# Patient Record
Sex: FEMALE | Race: WHITE | NOT HISPANIC OR LATINO | URBAN - METROPOLITAN AREA
[De-identification: names, ages, dates, MRNs, and addresses within clinical notes are randomized per-mention and may not be internally consistent; named-entity substitution may affect disease eponyms.]

---

## 2023-05-30 ENCOUNTER — EMERGENCY (EMERGENCY)
Facility: HOSPITAL | Age: 23
LOS: 1 days | Discharge: HOME | End: 2023-05-30
Attending: EMERGENCY MEDICINE | Admitting: EMERGENCY MEDICINE
Payer: SELF-PAY

## 2023-05-30 VITALS
DIASTOLIC BLOOD PRESSURE: 70 MMHG | OXYGEN SATURATION: 99 % | TEMPERATURE: 98 F | SYSTOLIC BLOOD PRESSURE: 112 MMHG | HEART RATE: 72 BPM | RESPIRATION RATE: 16 BRPM

## 2023-05-30 VITALS
OXYGEN SATURATION: 99 % | SYSTOLIC BLOOD PRESSURE: 106 MMHG | DIASTOLIC BLOOD PRESSURE: 79 MMHG | RESPIRATION RATE: 16 BRPM | HEART RATE: 70 BPM | TEMPERATURE: 99 F

## 2023-05-30 DIAGNOSIS — Z91.013 ALLERGY TO SEAFOOD: ICD-10-CM

## 2023-05-30 DIAGNOSIS — Z91.012 ALLERGY TO EGGS: ICD-10-CM

## 2023-05-30 DIAGNOSIS — Z91.011 ALLERGY TO MILK PRODUCTS: ICD-10-CM

## 2023-05-30 DIAGNOSIS — L50.9 URTICARIA, UNSPECIFIED: ICD-10-CM

## 2023-05-30 PROCEDURE — 99284 EMERGENCY DEPT VISIT MOD MDM: CPT

## 2023-05-30 RX ORDER — DIPHENHYDRAMINE HCL 50 MG
1 CAPSULE ORAL
Qty: 15 | Refills: 0
Start: 2023-05-30 | End: 2023-06-03

## 2023-05-30 RX ORDER — DIPHENHYDRAMINE HCL 50 MG
25 CAPSULE ORAL ONCE
Refills: 0 | Status: COMPLETED | OUTPATIENT
Start: 2023-05-30 | End: 2023-05-30

## 2023-05-30 RX ORDER — FAMOTIDINE 10 MG/ML
20 INJECTION INTRAVENOUS ONCE
Refills: 0 | Status: COMPLETED | OUTPATIENT
Start: 2023-05-30 | End: 2023-05-30

## 2023-05-30 RX ORDER — EPINEPHRINE 0.3 MG/.3ML
0.3 INJECTION INTRAMUSCULAR; SUBCUTANEOUS
Qty: 2 | Refills: 0
Start: 2023-05-30

## 2023-05-30 RX ADMIN — FAMOTIDINE 20 MILLIGRAM(S): 10 INJECTION INTRAVENOUS at 21:12

## 2023-05-30 RX ADMIN — Medication 25 MILLIGRAM(S): at 21:12

## 2023-05-30 RX ADMIN — Medication 125 MILLIGRAM(S): at 21:13

## 2023-05-30 NOTE — ED PROVIDER NOTE - OBJECTIVE STATEMENT
22 y/o F w/hx food allergies (dairy, shellfish) w/hives (no anaphyalxis), has never required epi, p/w total body hives, no other sx, which began at 8PM. Pt cannot identify any allergen, hasn't eaten since noon. Took 50mg benadryl 30min PTA w/moderate improvement of rash.

## 2023-05-30 NOTE — ED ADULT NURSE NOTE - NSFALLUNIVINTERV_ED_ALL_ED
Bed/Stretcher in lowest position, wheels locked, appropriate side rails in place/Call bell, personal items and telephone in reach/Instruct patient to call for assistance before getting out of bed/chair/stretcher/Non-slip footwear applied when patient is off stretcher/[place] to call system/Physically safe environment - no spills, clutter or unnecessary equipment/Purposeful proactive rounding/Room/bathroom lighting operational, light cord in reach

## 2023-05-30 NOTE — ED PROVIDER NOTE - NSFOLLOWUPINSTRUCTIONS_ED_ALL_ED_FT
Hives  Hives (urticaria) are itchy, red, swollen areas on the skin. Hives can appear on any part of the body. Hives often fade within 24 hours (acute hives). Sometimes, new hives appear after old ones fade and the cycle can continue for several days or weeks (chronic hives). Hives do not spread from person to person (are not contagious).    Hives come from the body's reaction to something a person is allergic to (allergen), something that causes irritation, or various other triggers. When a person is exposed to a trigger, his or her body releases a chemical (histamine) that causes redness, itching, and swelling. Hives can appear right after exposure to a trigger or hours later.    What are the causes?  This condition may be caused by:  Allergies to foods or ingredients.  Insect bites or stings.  Exposure to pollen or pets.  Spending time in sunlight, heat, or cold (exposure).  Exercise.  Stress.  You can also get hives from other medical conditions and treatments, such as:  Viruses, including the common cold.  Bacterial infections, such as urinary tract infections and strep throat.  Certain medicines.  Contact with latex or chemicals.  Allergy shots.  Blood transfusions.  Sometimes, the cause of this condition is not known (idiopathic hives).    What increases the risk?  You are more likely to develop this condition if you:  Are a woman.  Have food allergies, especially to citrus fruits, milk, eggs, peanuts, tree nuts, or shellfish.  Are allergic to:  Medicines.  Latex.  Insects.  Animals.  Pollen.  What are the signs or symptoms?  A red rash on a person's upper arm.  Common symptoms of this condition include raised, itchy, red or white bumps or patches on your skin. These areas may:  Become large and swollen (welts).  Change in shape and location, quickly and repeatedly.  Be separate hives or connect over a large area of skin.  Sting or become painful.  Turn white when pressed in the center (monse).  In severe cases, your hands, feet, and face may also become swollen. This may occur if hives develop deeper in your skin.    How is this diagnosed?  This condition may be diagnosed by your symptoms, medical history, and physical exam.  Your skin, urine, or blood may be tested to find out what is causing your hives and to rule out other health issues.  Your health care provider may also remove a small sample of skin from the affected area and examine it under a microscope (biopsy).  How is this treated?  Treatment for this condition depends on the cause and severity of your symptoms. Your health care provider may recommend using cool, wet cloths (cool compresses) or taking cool showers to relieve itching. Treatment may include:  Medicines that help:  Relieve itching (antihistamines).  Reduce swelling (corticosteroids).  Treat infection (antibiotics).  An injectable medicine (omalizumab). Your health care provider may prescribe this if you have chronic idiopathic hives and you continue to have symptoms even after treatment with antihistamines.  Severe cases may require an emergency injection of adrenaline (epinephrine) to prevent a life-threatening allergic reaction (anaphylaxis).    Follow these instructions at home:  Medicines    Take and apply over-the-counter and prescription medicines only as told by your health care provider.  If you were prescribed an antibiotic medicine, take it as told by your health care provider. Do not stop using the antibiotic even if you start to feel better.  Skin care    Apply cool compresses to the affected areas.  Do not scratch or rub your skin.  General instructions    Do not take hot showers or baths. This can make itching worse.  Do not wear tight-fitting clothing.  Use sunscreen and wear protective clothing when you are outside.  Avoid any substances that cause your hives. Keep a journal to help track what causes your hives. Write down:  What medicines you take.  What you eat and drink.  What products you use on your skin.  Keep all follow-up visits as told by your health care provider. This is important.  Contact a health care provider if:  Your symptoms are not controlled with medicine.  Your joints are painful or swollen.  Get help right away if:  You have a fever.  You have pain in your abdomen.  Your tongue or lips are swollen.  Your eyelids are swollen.  Your chest or throat feels tight.  You have trouble breathing or swallowing.  These symptoms may represent a serious problem that is an emergency. Do not wait to see if the symptoms will go away. Get medical help right away. Call your local emergency services (911 in the [place] Do not drive yourself to the hospital.    Summary  Hives (urticaria) are itchy, red, swollen areas on your skin. Hives come from the body's reaction to something a person is allergic to (allergen), something that causes irritation, or various other triggers.  Treatment for this condition depends on the cause and severity of your symptoms.  Avoid any substances that cause your hives. Keep a journal to help track what causes your hives.  Take and apply over-the-counter and prescription medicines only as told by your health care provider.  Get help right away if your chest or throat feels tight or if you have trouble breathing or swallowing.

## 2023-05-30 NOTE — ED ADULT TRIAGE NOTE - CHIEF COMPLAINT QUOTE
Pt walked in c/o allergic reaction. Hx of anaphlaxis to shellfish and dairy, states did not have any today. Pt notes generalized hives beginning x1 hour, took benadryl PTA with partial relief. Pt denies diff swallowing or sob.

## 2023-05-30 NOTE — ED PROVIDER NOTE - PATIENT PORTAL LINK FT
You can access the FollowMyHealth Patient Portal offered by [facility] by registering at the following website: [email or web address]. By joining [email or web address]’s FollowMyHealth portal, you will also be able to view your health information using other applications (apps) compatible with our system.

## 2023-05-30 NOTE — ED PROVIDER NOTE - CLINICAL SUMMARY MEDICAL DECISION MAKING FREE TEXT BOX
No anaphylaxis, given benadryl/pepcid/solumedrol in ED w/good effect. VSS, no airway or GI involvement. Will d/c home with continued supportive care measures and return precautions.

## 2023-07-27 ENCOUNTER — EMERGENCY (EMERGENCY)
Facility: HOSPITAL | Age: 23
LOS: 1 days | Discharge: HOME | End: 2023-07-27
Attending: EMERGENCY MEDICINE | Admitting: EMERGENCY MEDICINE
Payer: COMMERCIAL

## 2023-07-27 VITALS
WEIGHT: 134.92 LBS | DIASTOLIC BLOOD PRESSURE: 60 MMHG | RESPIRATION RATE: 16 BRPM | SYSTOLIC BLOOD PRESSURE: 107 MMHG | OXYGEN SATURATION: 98 % | TEMPERATURE: 98 F | HEART RATE: 77 BPM

## 2023-07-27 VITALS
SYSTOLIC BLOOD PRESSURE: 121 MMHG | RESPIRATION RATE: 17 BRPM | DIASTOLIC BLOOD PRESSURE: 62 MMHG | OXYGEN SATURATION: 99 % | TEMPERATURE: 98 F | HEART RATE: 67 BPM

## 2023-07-27 LAB
APPEARANCE UR: CLEAR — SIGNIFICANT CHANGE UP
BACTERIA # UR AUTO: PRESENT /HPF — SIGNIFICANT CHANGE UP
BILIRUB UR-MCNC: NEGATIVE — SIGNIFICANT CHANGE UP
COLOR SPEC: YELLOW — SIGNIFICANT CHANGE UP
DIFF PNL FLD: NEGATIVE — SIGNIFICANT CHANGE UP
EPI CELLS # UR: PRESENT
GLUCOSE UR QL: NEGATIVE MG/DL — SIGNIFICANT CHANGE UP
GRAN CASTS # UR COMP ASSIST: PRESENT
HCG UR QL: NEGATIVE — SIGNIFICANT CHANGE UP
KETONES UR-MCNC: NEGATIVE MG/DL — SIGNIFICANT CHANGE UP
LEUKOCYTE ESTERASE UR-ACNC: ABNORMAL
NITRITE UR-MCNC: NEGATIVE — SIGNIFICANT CHANGE UP
PH UR: 7 — SIGNIFICANT CHANGE UP (ref 5–8)
PROT UR-MCNC: NEGATIVE MG/DL — SIGNIFICANT CHANGE UP
RBC CASTS # UR COMP ASSIST: 0 /HPF — SIGNIFICANT CHANGE UP (ref 0–4)
SP GR SPEC: 1.01 — SIGNIFICANT CHANGE UP (ref 1–1.03)
UROBILINOGEN FLD QL: 0.2 MG/DL — SIGNIFICANT CHANGE UP (ref 0.2–1)
WBC UR QL: 3 /HPF — SIGNIFICANT CHANGE UP (ref 0–5)

## 2023-07-27 PROCEDURE — 99284 EMERGENCY DEPT VISIT MOD MDM: CPT

## 2023-07-27 RX ORDER — METHOCARBAMOL 500 MG/1
1500 TABLET, FILM COATED ORAL ONCE
Refills: 0 | Status: COMPLETED | OUTPATIENT
Start: 2023-07-27 | End: 2023-07-27

## 2023-07-27 RX ORDER — LIDOCAINE 4 G/100G
1 CREAM TOPICAL ONCE
Refills: 0 | Status: COMPLETED | OUTPATIENT
Start: 2023-07-27 | End: 2023-07-27

## 2023-07-27 RX ORDER — LIDOCAINE 4 G/100G
1 CREAM TOPICAL
Qty: 1 | Refills: 0
Start: 2023-07-27

## 2023-07-27 RX ORDER — KETOROLAC TROMETHAMINE 30 MG/ML
30 SYRINGE (ML) INJECTION ONCE
Refills: 0 | Status: DISCONTINUED | OUTPATIENT
Start: 2023-07-27 | End: 2023-07-27

## 2023-07-27 RX ORDER — METHOCARBAMOL 500 MG/1
2 TABLET, FILM COATED ORAL
Qty: 30 | Refills: 0
Start: 2023-07-27

## 2023-07-27 RX ADMIN — LIDOCAINE 1 PATCH: 4 CREAM TOPICAL at 20:56

## 2023-07-27 RX ADMIN — Medication 30 MILLIGRAM(S): at 20:56

## 2023-07-27 RX ADMIN — METHOCARBAMOL 1500 MILLIGRAM(S): 500 TABLET, FILM COATED ORAL at 20:57

## 2023-07-27 NOTE — ED ADULT NURSE NOTE - OBJECTIVE STATEMENT
Pt reports bilateral lower back pain started 2 weeks ago after a workout class, had been present every day but bearable. Pt again did a workout class today and pain became more severe. No pain meds taken, no numbness/tingling, no loss of bowel/bladder. Pt given urine cup and ambulated with no difficulty to the bathroom.

## 2023-07-27 NOTE — ED PROVIDER NOTE - NSFOLLOWUPINSTRUCTIONS_ED_ALL_ED_FT
A lumbar strain, which is sometimes called a low-back strain, is a stretch or tear in a muscle or the strong cords of tissue that attach muscle to bone (tendons) in the lower back (lumbar spine). This type of injury occurs when muscles or tendons are torn or are stretched beyond their limits.    Lumbar strains can range from mild to severe. Mild strains may involve stretching a muscle or tendon without tearing it. These may heal in 1–2 weeks. More severe strains involve tearing of muscle fibers or tendons. These will cause more pain and may take 6–8 weeks to heal.    What are the causes?  This condition may be caused by:    Trauma, such as a fall or a hit to the body.  Twisting or overstretching the back. This may result from doing activities that need a lot of energy, such as lifting heavy objects.    What increases the risk?  This injury is more common in:    Athletes.  People with obesity.  People who do repeated lifting, bending, or other movements that involve their back.    What are the signs or symptoms?  Symptoms of this condition may include:    Sharp or dull pain in the lower back that does not go away. The pain may extend to the buttocks.  Stiffness or limited range of motion.  Sudden muscle tightening (spasms).    How is this diagnosed?  This condition may be diagnosed based on:    Your symptoms.  Your medical history.  A physical exam.  Imaging tests, such as:    X-rays.  MRI.    How is this treated?  Treatment for this condition may include:    Rest.  Applying heat and cold to the affected area.  Over-the-counter medicines to help relieve pain and inflammation, such as NSAIDs.  Prescription pain medicine and muscle relaxants may be needed for a short time.  Physical therapy.    Follow these instructions at home:      Managing pain, stiffness, and swelling      If directed, put ice on the injured area during the first 24 hours after your injury.    Put ice in a plastic bag.  Place a towel between your skin and the bag.  Leave the ice on for 20 minutes, 2–3 times a day.  If directed, apply heat to the affected area as often as told by your health care provider. Use the heat source that your health care provider recommends, such as a moist heat pack or a heating pad.    Place a towel between your skin and the heat source.  Leave the heat on for 20–30 minutes.  Remove the heat if your skin turns bright red. This is especially important if you are unable to feel pain, heat, or cold. You may have a greater risk of getting burned.        Activity    Rest and return to your normal activities as told by your health care provider. Ask your health care provider what activities are safe for you.  Do exercises as told by your health care provider.        Medicines    Take over-the-counter and prescription medicines only as told by your health care provider.  Ask your health care provider if the medicine prescribed to you:    Requires you to avoid driving or using heavy machinery.  Can cause constipation. You may need to take these actions to prevent or treat constipation:    Drink enough fluid to keep your urine pale yellow.  Take over-the-counter or prescription medicines.  Eat foods that are high in fiber, such as beans, whole grains, and fresh fruits and vegetables.  Limit foods that are high in fat and processed sugars, such as fried or sweet foods.        Injury prevention     To prevent a future low-back injury:    Always warm up properly before physical activity or sports.  Cool down and stretch after being active.  Use correct form when playing sports and lifting heavy objects. Bend your knees before you lift heavy objects.  Use good posture when sitting and standing.  Stay physically fit and keep a healthy weight.    Do at least 150 minutes of moderate-intensity exercise each week, such as brisk walking or water aerobics.  Do strength exercises at least 2 times each week.        General instructions    Do not use any products that contain nicotine or tobacco, such as cigarettes, e-cigarettes, and chewing tobacco. If you need help quitting, ask your health care provider.  Keep all follow-up visits as told by your health care provider. This is important.    Contact a health care provider if:  Your back pain does not improve after 6 weeks of treatment.  Your symptoms get worse.    Get help right away if:  Your back pain is severe.  You are unable to stand or walk.  You develop pain in your legs.  You develop weakness in your buttocks or legs.  You have difficulty controlling when you urinate or when you have a bowel movement.    You have frequent, painful, or bloody urination.  You have a temperature over 101.0°F (38.3°C)    Summary  A lumbar strain, which is sometimes called a low-back strain, is a stretch or tear in a muscle or the strong cords of tissue that attach muscle to bone (tendons) in the lower back (lumbar spine).  This type of injury occurs when muscles or tendons are torn or are stretched beyond their limits.  Rest and return to your normal activities as told by your health care provider. If directed, apply heat and ice to the affected area as often as told by your health care provider.  Take over-the-counter and prescription medicines only as told by your health care provider.  Contact a health care provider if you have new or worsening symptoms.

## 2023-07-27 NOTE — ED PROVIDER NOTE - MUSCULOSKELETAL, MLM
Spine appears normal, range of motion is limited by the pain, no muscle or joint tenderness, SLR negative

## 2023-07-27 NOTE — ED PROVIDER NOTE - PATIENT PORTAL LINK FT
You can access the FollowMyHealth Patient Portal offered by [facility] by registering at the following website: [email or web address]. By joining [employer]’s FollowMyHealth portal, you will also be able to view your health information using other applications (apps) compatible with our system.

## 2023-07-27 NOTE — ED PROVIDER NOTE - CLINICAL SUMMARY MEDICAL DECISION MAKING FREE TEXT BOX
pt with lumbar pain x 2 weeks.  No signs or symptoms of cauda equina.  Treated with IM Toradol, PO Robaxin, & Lidoderm patch.  UA unremarkable.  Will prescribe NSAID, Robaxin, & lidocaine patches.  F/U with Dr. Escamilla next week & encouraged to start physical therapy.  Advised patient that she may need outpatient MRI if no improvement.

## 2023-07-27 NOTE — ED PROVIDER NOTE - OBJECTIVE STATEMENT
Patient is a previously healthy 23-year-old female who has had right lower lumbar pain for the past 2 weeks. She thinks she initially injured her back during a [employer] class. She denies any recent fall or direct trauma to her back. The pain started the day after the class and she has been taking ibuprofen without much relief. She thought she was better and tried taking a class again yesterday and now the pain is worse. The pain occasionally radiates to her right buttock but not down her leg. She denies any weakness numbness or paresthesias of her extremities. She denies any bowel or bladder problems. She denies any dysuria or hematuria. She denies any fevers or chills. She has no abdominal pain. No menstrual cramps vaginal bleeding or vaginal discharge. No prior history of back problems in the past. The pain is worse with movement.

## 2023-07-31 DIAGNOSIS — M54.50 LOW BACK PAIN, UNSPECIFIED: ICD-10-CM

## 2023-07-31 DIAGNOSIS — S39.012A STRAIN OF MUSCLE, FASCIA AND TENDON OF LOWER BACK, INITIAL ENCOUNTER: ICD-10-CM
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
## 2023-07-31 DIAGNOSIS — Z91.013 ALLERGY TO SEAFOOD: ICD-10-CM

## 2023-07-31 DIAGNOSIS — Z91.012 ALLERGY TO EGGS: ICD-10-CM

## 2023-07-31 DIAGNOSIS — Z91.011 ALLERGY TO MILK PRODUCTS: ICD-10-CM

## 2024-05-06 ENCOUNTER — EMERGENCY (EMERGENCY)
Facility: HOSPITAL | Age: 24
LOS: 1 days | Discharge: HOME | End: 2024-05-06
Admitting: EMERGENCY MEDICINE
Payer: COMMERCIAL

## 2024-05-06 VITALS
HEIGHT: 66 IN | DIASTOLIC BLOOD PRESSURE: 85 MMHG | SYSTOLIC BLOOD PRESSURE: 136 MMHG | HEART RATE: 85 BPM | WEIGHT: 139.99 LBS | RESPIRATION RATE: 16 BRPM | TEMPERATURE: 98 F | OXYGEN SATURATION: 98 %

## 2024-05-06 PROCEDURE — 99284 EMERGENCY DEPT VISIT MOD MDM: CPT

## 2024-05-06 RX ORDER — FAMOTIDINE 10 MG/ML
20 INJECTION INTRAVENOUS ONCE
Refills: 0 | Status: COMPLETED | OUTPATIENT
Start: 2024-05-06 | End: 2024-05-06

## 2024-05-06 RX ADMIN — Medication 125 MILLIGRAM(S): at 22:07

## 2024-05-06 RX ADMIN — FAMOTIDINE 20 MILLIGRAM(S): 10 INJECTION INTRAVENOUS at 22:07

## 2024-05-06 NOTE — ED ADULT NURSE NOTE - NSFALLUNIVINTERV_ED_ALL_ED
Bed/Stretcher in lowest position, wheels locked, appropriate side rails in place/Call bell, personal items and telephone in reach/Instruct patient to call for assistance before getting out of bed/chair/stretcher/Non-slip footwear applied when patient is off stretcher/[place] to call system/Physically safe environment - no spills, clutter or unnecessary equipment/Purposeful proactive rounding/Room/bathroom lighting operational, light cord in reach

## 2024-05-06 NOTE — ED PROVIDER NOTE - OBJECTIVE STATEMENT
23 y/o F with no pmhx presents to the ED c/o hives to b/l upper extremities and lower extremities and back that started around 1930 this evening. Pt reports she felt some pruritus around the volar aspect of her wrist and noticed hives. Pt states that after some time she noticed that the hives were starting to spread up her arms and on her inner thighs. Pt took benadryl around 2000 this evening and when the hives did not resolved came to the ED for evaluation. Pt reports a similar episodes about 5 years ago - however at that time pt was having angioedema around the eyes and was admitted to the hospital for evaluation as sx kept recurring. Denies any facial swelling, tongue swelling, feeling like her throat is closing up, CP, SOB, N/V, abd pain, new foods/lotions/detergents/jewelry. pt reports allergy to shellfish, eggs and dairy with prior hx of anaphylaxis which pt notes this does not feel like.

## 2024-05-06 NOTE — ED ADULT TRIAGE NOTE - CHIEF COMPLAINT QUOTE
PT. walk in for hives all over since 730pm. Took benadry 50mg at 8pm but feels like it is getting worse. Denies SOB, tongue and throat swelling.

## 2024-05-06 NOTE — ED PROVIDER NOTE - PHYSICAL EXAMINATION
General: Well appearing in no acute distress, alert and cooperative  Eyes: PERRLA, no conjunctival injection, no scleral icterus. No periorbital swelling.   ENMT: Moist mucous membranes, oropharynx clear. No tongue swelling. No facial swelling.   Neck: Soft and supple  Cardiac: Regular rate and regular rhythm, no murmurs  Resp: Airway patent. Unlabored respiratory effort, lungs CTAB, speaking in full sentences, no wheezes  Abd: Soft, non-tender, non-distended, no guarding or rebound tenderness  Skin: Warm and dry. +maculopapular rash noted on b/l inner thighs, b/l upper extremities.  Neuro: AO x 3, moves all extremities symmetrically, Motor strength 5/5 bilaterally UE and LE, sensation grossly intact

## 2024-05-06 NOTE — ED PROVIDER NOTE - PROGRESS NOTE DETAILS
Pt signed out to me by day shift PA pending reassessment.  Pt is sittingn comfortably in room, no acute distress. Has epipen at home. Would like to be discharged  All results reviewed with pt. pt understands and agrees with plan. Agreed to follow up with pcp in 2-3 days.

## 2024-05-06 NOTE — ED PROVIDER NOTE - PATIENT PORTAL LINK FT
You can access the FollowMyHealth Patient Portal offered by [facility] by registering at the following website: [email or web address]. By joining [employer]’s FollowMyHealth portal, you will also be able to view your health information using other applications (apps) compatible with our system.

## 2024-05-06 NOTE — ED ADULT NURSE NOTE - OBJECTIVE STATEMENT
pt states she noticed hives on her body today of unknown origin, pt states this happens every few years. pt denies sob, resp distress.

## 2024-05-06 NOTE — ED PROVIDER NOTE - CLINICAL SUMMARY MEDICAL DECISION MAKING FREE TEXT BOX
23 y/o F with no pmhx presents to the ED c/o hives that started this evening around 1930. Pt took benadryl 50mg at home around 2000. Currently pt reports relief in sx with resolution of hives. Patient currently afebrile, hemodynamically stable, spO2 100%. Based on history and physical, differentials include but are not limited to allergic reaction vs contact dermatitis. No labs or imaging indicated at this time. Will administer medications for symptomatic relief, follow-up on results, and reassess.

## 2024-05-07 PROBLEM — Z78.9 OTHER SPECIFIED HEALTH STATUS: Chronic | Status: ACTIVE | Noted: 2023-07-27

## 2024-05-09 DIAGNOSIS — L50.9 URTICARIA, UNSPECIFIED: ICD-10-CM

## 2024-05-09 DIAGNOSIS — Z91.013 ALLERGY TO SEAFOOD: ICD-10-CM

## 2024-05-09 DIAGNOSIS — Z91.011 ALLERGY TO MILK PRODUCTS: ICD-10-CM

## 2024-05-09 DIAGNOSIS — Z91.012 ALLERGY TO EGGS: ICD-10-CM

## 2024-12-08 NOTE — ED PROVIDER NOTE - MDM ORDERS SUBMITTED SELECTION
Patient arrives ambulatory with mother who reports cough x 1 week along with URI symptoms. Reports recent flu B diagnosis on 11/29.  
Labs/Medications